# Patient Record
Sex: FEMALE | Race: NATIVE HAWAIIAN OR OTHER PACIFIC ISLANDER | HISPANIC OR LATINO | Employment: FULL TIME | URBAN - METROPOLITAN AREA
[De-identification: names, ages, dates, MRNs, and addresses within clinical notes are randomized per-mention and may not be internally consistent; named-entity substitution may affect disease eponyms.]

---

## 2023-10-24 ENCOUNTER — PATIENT MESSAGE (OUTPATIENT)
Dept: OBSTETRICS AND GYNECOLOGY | Facility: CLINIC | Age: 23
End: 2023-10-24
Payer: MEDICAID

## 2023-11-06 ENCOUNTER — OFFICE VISIT (OUTPATIENT)
Dept: OBSTETRICS AND GYNECOLOGY | Facility: CLINIC | Age: 23
End: 2023-11-06
Payer: MEDICAID

## 2023-11-06 VITALS
WEIGHT: 196.44 LBS | DIASTOLIC BLOOD PRESSURE: 68 MMHG | BODY MASS INDEX: 32.73 KG/M2 | SYSTOLIC BLOOD PRESSURE: 118 MMHG | HEIGHT: 65 IN

## 2023-11-06 DIAGNOSIS — O34.219 HISTORY OF CESAREAN DELIVERY, CURRENTLY PREGNANT: Primary | ICD-10-CM

## 2023-11-06 DIAGNOSIS — O09.299 HISTORY OF FETAL ANOMALY IN PRIOR PREGNANCY, CURRENTLY PREGNANT: ICD-10-CM

## 2023-11-06 DIAGNOSIS — Z87.59 HISTORY OF GESTATIONAL HYPERTENSION: ICD-10-CM

## 2023-11-06 PROCEDURE — 3074F PR MOST RECENT SYSTOLIC BLOOD PRESSURE < 130 MM HG: ICD-10-PCS | Mod: CPTII,,, | Performed by: ADVANCED PRACTICE MIDWIFE

## 2023-11-06 PROCEDURE — 99213 OFFICE O/P EST LOW 20 MIN: CPT | Mod: PBBFAC,TH,PN | Performed by: ADVANCED PRACTICE MIDWIFE

## 2023-11-06 PROCEDURE — 3008F PR BODY MASS INDEX (BMI) DOCUMENTED: ICD-10-PCS | Mod: CPTII,,, | Performed by: ADVANCED PRACTICE MIDWIFE

## 2023-11-06 PROCEDURE — 99214 PR OFFICE/OUTPT VISIT, EST, LEVL IV, 30-39 MIN: ICD-10-PCS | Mod: S$PBB,TH,, | Performed by: ADVANCED PRACTICE MIDWIFE

## 2023-11-06 PROCEDURE — 1159F MED LIST DOCD IN RCRD: CPT | Mod: CPTII,,, | Performed by: ADVANCED PRACTICE MIDWIFE

## 2023-11-06 PROCEDURE — 3078F PR MOST RECENT DIASTOLIC BLOOD PRESSURE < 80 MM HG: ICD-10-PCS | Mod: CPTII,,, | Performed by: ADVANCED PRACTICE MIDWIFE

## 2023-11-06 PROCEDURE — 99214 OFFICE O/P EST MOD 30 MIN: CPT | Mod: S$PBB,TH,, | Performed by: ADVANCED PRACTICE MIDWIFE

## 2023-11-06 PROCEDURE — 99999 PR PBB SHADOW E&M-EST. PATIENT-LVL III: CPT | Mod: PBBFAC,,, | Performed by: ADVANCED PRACTICE MIDWIFE

## 2023-11-06 PROCEDURE — 99999 PR PBB SHADOW E&M-EST. PATIENT-LVL III: ICD-10-PCS | Mod: PBBFAC,,, | Performed by: ADVANCED PRACTICE MIDWIFE

## 2023-11-06 PROCEDURE — 3078F DIAST BP <80 MM HG: CPT | Mod: CPTII,,, | Performed by: ADVANCED PRACTICE MIDWIFE

## 2023-11-06 PROCEDURE — 1159F PR MEDICATION LIST DOCUMENTED IN MEDICAL RECORD: ICD-10-PCS | Mod: CPTII,,, | Performed by: ADVANCED PRACTICE MIDWIFE

## 2023-11-06 PROCEDURE — 3074F SYST BP LT 130 MM HG: CPT | Mod: CPTII,,, | Performed by: ADVANCED PRACTICE MIDWIFE

## 2023-11-06 PROCEDURE — 3008F BODY MASS INDEX DOCD: CPT | Mod: CPTII,,, | Performed by: ADVANCED PRACTICE MIDWIFE

## 2023-11-06 NOTE — PROGRESS NOTES
"11/10/2023.    Records are reviewed.    This pregnancy.    No lab results to review.    Ultrasounds as follows.    2023 at 6 weeks and 4 days-single IUP with EDC 2024.    2023 at 14 weeks 0 days-single IUP with posterior placenta.    2023 at 18 weeks-anatomy ultrasound with posterior placenta, normal fluid, three-vessel cord, normal anatomy with suboptimal heart views.    2023 at 19 weeks 3 days-anatomy scan is completed with no obvious abnormalities-reassuring.      History of previous  section.    2019 at 35 weeks and 1 day.   premature rupture of membranes with variable decelerations proceeded to documented low transverse  section with tight nuchal cord and scant amniotic fluid.      -per patient report at approximately 20 weeks-delivery of male infant with facial structure "caved in"-have received no records regarding this    If no lab results will need to run full prenatal workup at next visit      CC: Absence of menses risk assessment [facility] 2023    Malina Gipson is a 23 y.o. female  presents with complaint of absence of menstruation.  She denies nausea/vomIting/abdominal pain/bleeding.  UPT is positive.      Transfer from [place] with no records-requested.    EDC is 2024 therefore 27 weeks gestation    Past Medical History:   Diagnosis Date    Anemia     Disorder of kidney and ureter     Kidney stones     Postpartum depression     Trauma      Past Surgical History:   Procedure Laterality Date     SECTION      DILATION AND CURETTAGE OF UTERUS       Social History     Socioeconomic History    Marital status: Single   Tobacco Use    Smoking status: Never     Passive exposure: Never    Smokeless tobacco: Never   Substance and Sexual Activity    Alcohol use: Not Currently    Drug use: Never    Sexual activity: Yes     Partners: Male     Family History   Problem Relation Age of Onset    Eclampsia Maternal Aunt     Breast " "cancer Neg Hx     Colon cancer Neg Hx     Ovarian cancer Neg Hx     Thrombosis Neg Hx      OB History    Para Term  AB Living   4 1   1 2 1   SAB IAB Ectopic Multiple Live Births   2       1      # Outcome Date GA Lbr Natan/2nd Weight Sex Delivery Anes PTL Lv   4 Current            3 2022              Birth Comments: D&C   2  19 34w5d  2.098 kg (4 lb 10 oz) F CS-Unspec Spinal  [facility]      Complications: PIH (pregnancy induced hypertension), PROM (premature rupture of membranes)   1 2018 5w0d              /68   Ht 5' 5" (1.651 m)   Wt 89.1 kg (196 lb 6.9 oz)   BMI 32.69 kg/m²         ROS:  GENERAL: Denies weight gain or weight loss. Feeling well overall.   SKIN: Denies rash or lesions.   HEAD: Denies head injury or headache.   NODES: Denies enlarged lymph nodes.   CHEST: Denies chest pain or shortness of breath.   CARDIOVASCULAR: Denies palpitations or left sided chest pain.   ABDOMEN: No abdominal pain, constipation, diarrhea, nausea, vomiting or rectal bleeding.   URINARY: No frequency, dysuria, hematuria, or burning on urination.  REPRODUCTIVE: See HPI.   BREASTS: The patient performs breast self-examination and denies pain, lumps, or nipple discharge.   HEMATOLOGIC: No easy bruisability or excessive bleeding.   MUSCULOSKELETAL: Denies joint pain or swelling.   NEUROLOGIC: Denies syncope or weakness.   PSYCHIATRIC: Denies depression, anxiety or mood swings.    PE:   APPEARANCE: Well nourished, well developed, in no acute distress.  AFFECT: WNL, alert and oriented x 3.  SKIN: No acne or hirsutism.  NECK: Neck symmetric without masses or thyromegaly.  NODES: No inguinal, cervical, axillary or femoral lymph node enlargement.  CHEST: Good respiratory effort.   ABDOMEN: Soft. No tenderness or masses. No hepatosplenomegaly. No hernias.  BREASTS: Symmetrical, no skin changes or visible lesions. No palpable masses, nipple discharge bilaterally.  PELVIC: Normal external female " genitalia without lesions. Normal hair distribution. Adequate perineal body, normal urethral meatus. Vagina moist and well rugated without lesions or discharge. Cervix pink, without lesions, discharge or tenderness. No significant cystocele or rectocele. Bimanual exam shows uterus is 26 weeks, regular, mobile and nontender. Adnexa without masses or tenderness.  EXTREMITIES: No edema.          ASSESSMENT and PLAN:  23-year-old  4 para 1 transferring from [place] at 27 weeks gestational age with no records-requested today.    History of  section at 34 weeks and 5 days secondary to gestational hypertension intrauterine growth restriction and desiring repeat-states had blood transfusion, seizures  Taking daily baby aspirin. -normotensive today   States had negative genetic screen  First child together  Second-trimester expectations-information provided.    Return to office 1 week with 28 week labs and hopefully we should have received records to complete information       Information for review  -      Based on A.C.S. Pap guidelines, recommendation yearly pelvic exams, mammograms and monthly self breast exams; to see her PCP for other health maintenance and pregnancy.   -      Patient's medications and medical history reviewed with patient and implications in pregnancy.   -      Pregnancy course, visits every 4 weeks until 28 weeks, every 2 weeks until 36 weeks and weekly until delivery. Ultra sound for dates in 1st trimester, anatomy ultrasound at 20-22 weeks and growth ulrasound at 36 weeks. This could be changed according to other pregnancy developments.  .Proper weight gain based on the [employer]'s recommendations based on her pre-pregnancy weight.   Foods to avoid in pregnancy (i.e. sushi, fish that are high in mercury, deli meat, and unpasteurized cheeses).   Advise prenatal vitamin options (i.e. stool softener, DHA).   Discussed potential medical problems in pregnancy.  -     Oriented  to practice including CNM collaboration.

## 2023-11-06 NOTE — PATIENT INSTRUCTIONS
Patient Education       Pregnancy - The Sixth Month   About this topic   It is important for you to learn how to take care of yourself to help you have a healthy baby and safe delivery. It is good to have health care throughout your pregnancy.  The sixth month of your pregnancy starts around week 24 and lasts through week 28. By knowing how far along you are, you can learn what is normal for this stage of your pregnancy and plan for what is next.  General   Your body   During the sixth month of your pregnancy, here are some things you can expect.  You may:  Start to gain a little more weight. It is normal to gain about 10 to 15 pounds (4.5 to 7 kg) total in your first 6 months.  Have problems like back pain, dry eyes, or aching hands and wrists  Itching of palms, abdomen, or soles of your feet  Swelling of ankles, fingers, or face.  Need to urinate more frequently.  Have problems with hemorrhoids. Be sure to eat plenty of fresh fruits and vegetables to increase the fiber in your diet. Drink plenty of water to help keep your stools soft.  Continue having Cheyenne Stringer contractions. You may feel your belly squeezing or getting tight.  Have a glucose test to test for gestational diabetes.  Have more headaches. Talk to your doctor about how to help with them.  See stretch marks on your belly, breasts, or legs. Stay active to try and keep good muscle tone.  See an increase in vaginal discharge. Talk to your doctor about what to expect.  Your baby's growth and development:  Your baby looks like a very small  baby.  They are able to live outside of your womb but would need a lot of help breathing, eating, and staying warm in an intensive care unit.  Your baby has times of being awake and times of being asleep. The babys eyelids are able to open and close.  They move more and have hiccups.  Your baby is about 14 inches (36 cm) long and weighs about 2 pounds (900 gm). Your baby is about the size of an eggplant.  Baby  may be able to hear voices.  Things to Think About   Avoid alcohol, drugs, tobacco products, and second hand smoke  Eat small meals throughout the day instead of larger meals.  Avoid spicy, greasy, or fatty foods.  Avoid lying down or bending over for around 3 hours after eating  Warm baths are fine to help you relax. Avoid hot tubs while you are pregnant.  Avoid straining when having bowel movements.  Learning how to care for your baby. You may want to sign up for classes on how to take care of a .  Are you planning on going back to work after having your baby? Its not too early to think about .  Consider starting your birth plan if you have specific needs or wishes for delivery.  When do I need to call the doctor?   Contractions every 10 minutes or more often that do not go away with drinking water or position changes  Low, dull back pain that does not go away  Pressure in your pelvis that feels like your baby is pushing down  A gush or constant trickle of watery or bloody fluid leaking from your vagina  Cramps in your lower belly that come and go or are constant  Change in your baby's movement  Fever of 100.4°F (38°C) or higher  Little to no movement felt by baby in 2 hours. Your baby should be moving at least 10 times every 2 hours.  Headache that does not go away; blurry vision; seeing spots or halos; increase in swelling in your hands, feet, or face; and pain under your ribs on the right side  Vaginal bleeding with or without pain  After a car accident, fall, or any trauma to your belly  Having thoughts of harming yourself or others, or do not feel safe at home  Where can I learn more?   American Academy of Family Physicians  https://familydoctor.org/changes-in-your-body-during-pregnancy-second-trimester/   KidsHealth  http://kidshealth.org/en/parents/pregnancy-calendar-intro.html?WT.ac=p-amaris   Office of Womens  Health  https://www.womenshealth.gov/pregnancy/youre-pregnant-now-what/stages-pregnancy   Last Reviewed Date   2020-05-06  Consumer Information Use and Disclaimer   This information is not specific medical advice and does not replace information you receive from your health care provider. This is only a brief summary of general information. It does NOT include all information about conditions, illnesses, injuries, tests, procedures, treatments, therapies, discharge instructions or life-style choices that may apply to you. You must talk with your health care provider for complete information about your health and treatment options. This information should not be used to decide whether or not to accept your health care providers advice, instructions or recommendations. Only your health care provider has the knowledge and training to provide advice that is right for you.  Copyright   Copyright © 2021 UpToDate, Inc. and its affiliates and/or licensors. All rights reserved.  Patient Education       Fetal Movement   About this topic   Feeling your baby move for the first time is a good sign that your baby is doing well. You may begin to feel these movements between the 18th and 25th weeks of your pregnancy. If this is your first time being pregnant, it may be closer to 25 weeks. Your baby has been moving around before this, but the kicks have not been strong enough for you to feel. During the first weeks of feeling movement, you may start to see a pattern during the day when your baby is most active. You can track your baby's kicks each day at home. This is also known as kick counting. It is a good way to check on your baby's movements and well being.  Most often, fetal kick counting is used in high-risk pregnancies. It may be useful for all pregnancies. Counting and writing down your baby's kicks, jabs, twists, flutters, rolls, turns, flips, and swishes may help find a problem that needs more evaluation. The American  "College of Obstetricians and Gynecologists, or ACOG, suggests that you record how much time it takes you to feel 10 of these movements. Ideally, you should be able to feel 10 movements within 2 hours. Many people will track these movements in much less time.  General   How to Track Your Baby's Kick Counts   Most often your doctor will want you to wait until the 28th to 30th weeks of your pregnancy to start kick counting. Here are some tips to help you get started.  Find the time of day when your baby is most active. For some people, this is right after eating. Others find their baby moving a lot after they have been exercising or more active. Some babies are more active in the evenings when your blood sugar starts to lower.  Try to count kicks at about the same time each day.  Before you start counting, have something to eat or drink. Also take a short walk or do some light activity.  Choose a quiet place where you can focus on your baby's movements. Also get in a comfortable position. Try and lie on one side or the other. You may need to change positions until you find one that works best for you and your baby.  Keep a notebook to track your baby's kicks. Your doctor may give you a chart to use or you can make your own. Write down the date, time you started counting, and the time of each "kick" during a 2-hour period until you have felt 10 kicks.  Once you have recorded 10 kicks within 2 hours you can stop counting.  If you are not able to record 10 movements over 2 hours you should get up and move around or eat something and try again.  If you are not able to record 10 movements over 2 hours the second time, call your doctor. They may want you to go to the hospital to get your baby checked.  When do I need to call the doctor?   You have felt less than 10 movements over a period of 2 hours.  It takes longer each day to record 10 movements.  There is a big change in the pattern of movements you are writing " down.  You feel no movement for 2 hours even after eating a snack, light activity, and position changes.  Teach Back: Helping You Understand   The Teach Back Method helps you understand the information we are giving you. After you talk with the staff, tell them in your own words what you learned. This helps to make sure the staff has described each thing clearly. It also helps to explain things that may have been confusing. Before going home, make sure you can do these:  I can tell you about feeling my baby move.  I can tell you how I will track my babys kicks.  I can tell you what I will do if I feel less than 10 movements in 2 hours, it takes longer to feel my baby move 10 times, or there is a big change in how my baby is moving.  Last Reviewed Date   2021-10-08  Consumer Information Use and Disclaimer   This information is not specific medical advice and does not replace information you receive from your health care provider. This is only a brief summary of general information. It does NOT include all information about conditions, illnesses, injuries, tests, procedures, treatments, therapies, discharge instructions or life-style choices that may apply to you. You must talk with your health care provider for complete information about your health and treatment options. This information should not be used to decide whether or not to accept your health care providers advice, instructions or recommendations. Only your health care provider has the knowledge and training to provide advice that is right for you.  Copyright   Copyright © 2021 [employer], [employer]. and its affiliates and/or licensors. All rights reserved.

## 2023-11-14 ENCOUNTER — TELEPHONE (OUTPATIENT)
Dept: OBSTETRICS AND GYNECOLOGY | Facility: CLINIC | Age: 23
End: 2023-11-14
Payer: MEDICAID

## 2023-11-14 ENCOUNTER — PATIENT MESSAGE (OUTPATIENT)
Dept: URGENT CARE | Facility: CLINIC | Age: 23
End: 2023-11-14
Payer: MEDICAID

## 2023-11-14 NOTE — TELEPHONE ENCOUNTER
Spoke with patient, is wanting to let us know that her  in  is not to be discussed in front of her current partner.    Also asking about ultrasound and steroids.  Per Riley, no indication of needing either as of right now.     Patient notified and verbalized understanding.

## 2023-11-14 NOTE — TELEPHONE ENCOUNTER
----- Message from Delores Piña sent at 11/14/2023  8:56 AM CST -----  States she would like a call back regarding some discomfort and plan of treatment. Please call pt [phone] or [phone]. Thank you